# Patient Record
Sex: MALE | URBAN - METROPOLITAN AREA
[De-identification: names, ages, dates, MRNs, and addresses within clinical notes are randomized per-mention and may not be internally consistent; named-entity substitution may affect disease eponyms.]

---

## 2023-06-10 ENCOUNTER — EMERGENCY (EMERGENCY)
Facility: HOSPITAL | Age: 38
LOS: 1 days | Discharge: HOME | End: 2023-06-10
Admitting: EMERGENCY MEDICINE
Payer: SELF-PAY

## 2023-06-10 VITALS
SYSTOLIC BLOOD PRESSURE: 133 MMHG | OXYGEN SATURATION: 98 % | TEMPERATURE: 97 F | DIASTOLIC BLOOD PRESSURE: 90 MMHG | WEIGHT: 158.07 LBS | HEART RATE: 86 BPM | HEIGHT: 70 IN | RESPIRATION RATE: 18 BRPM

## 2023-06-10 DIAGNOSIS — K52.9 NONINFECTIVE GASTROENTERITIS AND COLITIS, UNSPECIFIED: ICD-10-CM

## 2023-06-10 DIAGNOSIS — R10.13 EPIGASTRIC PAIN: ICD-10-CM

## 2023-06-10 PROCEDURE — 99053 MED SERV 10PM-8AM 24 HR FAC: CPT

## 2023-06-10 PROCEDURE — 99283 EMERGENCY DEPT VISIT LOW MDM: CPT

## 2023-06-10 RX ORDER — IBUPROFEN 200 MG
600 TABLET ORAL ONCE
Refills: 0 | Status: COMPLETED | OUTPATIENT
Start: 2023-06-10 | End: 2023-06-10

## 2023-06-10 RX ORDER — ONDANSETRON 8 MG/1
1 TABLET, FILM COATED ORAL
Qty: 9 | Refills: 0
Start: 2023-06-10 | End: 2023-06-12

## 2023-06-10 RX ORDER — FAMOTIDINE 10 MG/ML
20 INJECTION INTRAVENOUS ONCE
Refills: 0 | Status: COMPLETED | OUTPATIENT
Start: 2023-06-10 | End: 2023-06-10

## 2023-06-10 RX ORDER — FAMOTIDINE 10 MG/ML
1 INJECTION INTRAVENOUS
Qty: 7 | Refills: 0
Start: 2023-06-10 | End: 2023-06-16

## 2023-06-10 RX ORDER — ONDANSETRON 8 MG/1
4 TABLET, FILM COATED ORAL ONCE
Refills: 0 | Status: COMPLETED | OUTPATIENT
Start: 2023-06-10 | End: 2023-06-10

## 2023-06-10 RX ADMIN — ONDANSETRON 4 MILLIGRAM(S): 8 TABLET, FILM COATED ORAL at 03:20

## 2023-06-10 RX ADMIN — Medication 30 MILLILITER(S): at 01:58

## 2023-06-10 RX ADMIN — Medication 600 MILLIGRAM(S): at 01:59

## 2023-06-10 RX ADMIN — FAMOTIDINE 20 MILLIGRAM(S): 10 INJECTION INTRAVENOUS at 01:59

## 2023-06-10 NOTE — ED PROVIDER NOTE - NS ED ROS FT
Review of Systems    Constitutional: (-) fever (-) weakness (-) diaphoresis   Eyes: (-) change in vision (-) eye pain  ENT: (-) sore throat (-) ear ache (-) nasal discharge  Cardiovascular: (-) chest pain  (-) palpitations  Respiratory: (-) SOB (-) cough   GI: SEE HPI  Integumentary: (-) rash (-) redness   Neurological:  (-) focal deficit (-) altered mental status

## 2023-06-10 NOTE — ED PROVIDER NOTE - NSFOLLOWUPINSTRUCTIONS_ED_ALL_ED_FT
1st check to see if patient has seen the results.  If not then  CALL patient with results and Document verification.  Schedule follow-up if needed.  [phone]  MRI of the brain reviewed by radiology as normal.
Viral Gastroenteritis, Adult    Viral gastroenteritis is also known as the stomach flu. This condition is caused by certain germs (viruses). These germs can be passed from person to person very easily (are very contagious). This condition can cause sudden watery poop (diarrhea), fever, and throwing up (vomiting).    Having watery poop and throwing up can make you feel weak and cause you to get dehydrated. Dehydration can make you tired and thirsty, make you have a dry mouth, and make it so you pee (urinate) less often. Older adults and people with other diseases or a weak defense system (immune system) are at higher risk for dehydration. It is important to replace the fluids that you lose from having watery poop and throwing up.    HOME CARE  Follow instructions from your doctor about how to care for yourself at home.    Eating and Drinking    Follow these instructions as told by your doctor:    Take an oral rehydration solution (ORS). This is a drink that is sold at pharmacies and stores.   Drink clear fluids in small amounts as you are able, such as:  Water.  Ice chips.  Diluted fruit juice.  Low-calorie sports drinks.  Eat bland, easy-to-digest foods in small amounts as you are able, such as:  Bananas.  Applesauce.  Rice.  Low-fat (lean) meats.  Toast.  Crackers.  Avoid fluids that have a lot of sugar or caffeine in them, such as:  Energy drinks.  Sports drinks.  Soda.  Avoid alcohol.  Avoid spicy or fatty foods.    General Instructions    Drink enough fluid to keep your pee (urine) clear or pale yellow.  Wash your hands often. If you cannot use soap and water, use hand .  Make sure that all people in your home wash their hands well and often.  Rest at home while you get better.  Take over-the-counter and prescription medicines only as told by your doctor.  Watch your condition for any changes.  Take a warm bath to help with any burning or pain from having watery poop.  Keep all follow-up visits as told by your doctor. This is important.    GET HELP IF:  You cannot keep fluids down.  Your symptoms get worse.  You have new symptoms.  You feel light-headed or dizzy.  You have muscle cramps.    GET HELP RIGHT AWAY IF:  You have chest pain.  You feel very weak or you pass out (faint).  You see blood in your throw-up.  Your throw-up looks like coffee grounds.  You have bloody or black poop (stools) or poop that look like tar.  You have a very bad headache, a stiff neck, or both.  You have a rash.  You have very bad pain, cramping, or bloating in your belly (abdomen).  You have trouble breathing.  You are breathing very quickly.  Your heart is beating very quickly.  Your skin feels cold and clammy.  You feel confused.  You have pain when you pee.  You have signs of dehydration, such as:  Dark pee, hardly any pee, or no pee.  Cracked lips.  Dry mouth.  Sunken eyes.  Sleepiness.  Weakness.    ADDITIONAL NOTES AND INSTRUCTIONS    Please follow up with your Primary MD in 24-48 hr.  Seek immediate medical care for any new/worsening signs or symptoms.

## 2023-06-10 NOTE — ED PROVIDER NOTE - OBJECTIVE STATEMENT
38 yo m no sig pmhx pw gradual onset of epigastric discomfort with n/d after eating sandwich, reports 2 episodes of loose brown stools, frequent abd peristalsis w/nausea but no vomiting.     I have reviewed available current nursing and previous documentation of past medical, surgical, family, and/or social history.

## 2023-06-10 NOTE — ED PROVIDER NOTE - PATIENT PORTAL LINK FT
You can access the FollowMyHealth Patient Portal offered by [facility] by registering at the following website: [email or web address]. By joining [facility]’s FollowMyHealth portal, you will also be able to view your health information using other applications (apps) compatible with our system.

## 2023-06-10 NOTE — ED ADULT NURSE NOTE - NSFALLUNIVINTERV_ED_ALL_ED
Bed/Stretcher in lowest position, wheels locked, appropriate side rails in place/Call bell, personal items and telephone in reach/Instruct patient to call for assistance before getting out of bed/chair/stretcher/Non-slip footwear applied when patient is off stretcher/[place] to call system/Physically safe environment - no spills, clutter or unnecessary equipment/Purposeful proactive rounding/Room/bathroom lighting operational, light cord in reach

## 2023-06-10 NOTE — ED PROVIDER NOTE - PHYSICAL EXAMINATION
Physical Exam    Vital Signs: I have reviewed the initial vital signs.  Constitutional: well-appearing, appears stated age  Eyes: PERRLA, and symmetrical lids.  ENT: neck supple with no adenopathy, moist MM.  Cardiovascular: +S1/S2, no murmurs, regular rate, regular rhythm, well-perfused extremities  Respiratory: unlabored respiratory effort, clear to auscultation bilaterally, speaks in full sentences  Gastrointestinal: soft, non tender, non distended, no guarding

## 2023-06-10 NOTE — ED PROVIDER NOTE - CLINICAL SUMMARY MEDICAL DECISION MAKING FREE TEXT BOX
pt here with mild epigastric discomfort w/o any ttp on exam, reports loose brown stools, otherwise normal exam with non tender non distended abdomen, admits to eating food that tasted off, plan: Zofran, Pepcid, Maalox, ibuprofen, dc